# Patient Record
Sex: FEMALE | Race: WHITE
[De-identification: names, ages, dates, MRNs, and addresses within clinical notes are randomized per-mention and may not be internally consistent; named-entity substitution may affect disease eponyms.]

---

## 2020-01-05 ENCOUNTER — HOSPITAL ENCOUNTER (EMERGENCY)
Dept: HOSPITAL 59 - ER | Age: 65
Discharge: HOME | End: 2020-01-05
Payer: SELF-PAY

## 2020-01-05 DIAGNOSIS — S69.92XA: Primary | ICD-10-CM
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
## 2020-01-05 PROCEDURE — 99283 EMERGENCY DEPT VISIT LOW MDM: CPT

## 2020-01-05 NOTE — RADIOLOGY REPORT
EXAMINATION:  Left Hand, Minimum Three Views

EXAM DATE:  1/5/2020 1:46 PM



TECHNIQUE:  PA, lateral, and oblique



INDICATION:  trauma

COMPARISON:  9/22/2017



ENCOUNTER: Initial

_________________________



FINDINGS:  



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 There is lucency at both the carpal an
d metacarpal aspects consistent with loosening. No acute fracture.



Significant arthritic changes at the third and fourth PIP joints as well as all the DIP joints

_________________________



IMPRESSION:



Loosening around the prosthesis without evidence of fracture.







Dictated by: Helena Stark MD on 1/5/2020 2:22 PM.

Electronically signed by: Helena Stark MD on 1/5/2020 2:26 PM.

## 2020-01-05 NOTE — EMERGENCY DEPARTMENT RECORD
History of Present Illness





- General


Chief Complaint: Fall Injury


Stated Complaint: FALL/LT HAND PAIN


Time Seen by Provider: 20 13:29


Source: Patient


Mode of Arrival: Ambulatory


Limitations: No limitations





- History of Present Illness


Initial Comments: 


The patient is here due to L hand pain for an hour after tripping and falling 

and injuring it. She denies any L wrist pain or any other injuries. The pain is 

mainly over the proximal 1st MC bone.





MD Complaint: Fall


Onset/Timin


-: Hour(s)





- Related Data


                                    Allergies











Allergy/AdvReac Type Severity Reaction Status Date / Time


 


No Known Allergies Allergy   Unverified 17 08:51














Review of Systems


Constitutional: Denies: Chills, Fever





Past Medical History





- SOCIAL HISTORY


Smoking Status: Never smoker





- RESPIRATORY


Hx Respiratory Disorders: Yes


Hx Bronchitis: Yes (last winter)





- CARDIOVASCULAR


Hx Cardio Disorders: Yes


Hx Hypertension: Yes (meds good control)





- NEURO


Hx Neuro Disorders: Yes


Hx Seizures: Yes (grand mal, & petite-last one "years ago")


Hx Weakness: Yes (left thumb)





- GI


Hx GI Disorders: Yes


Hx Reflux: Yes





- 


Hx Genitourinary Disorders: No


Comment:: hyst





- ENDOCRINE


Hx Endocrine Disorders: No





- MUSCULOSKELETAL


Hx Musculoskeletal Disorders: Yes


Hx Arthritis: Yes (hands & all over)





- PSYCH


Hx Psych Problems: No





- HEMATOLOGY/ONCOLOGY


Hx Hematology/Oncology Disorders: No





Family Medical History


Hx Anxiety: Grandparents


Hx Cancer: Father


*Cancer Comment: dad-prostate


Hx Heart Disease: Mother, Grandparents


Hx HTN: Mother, Grandparents


Hx Seizures: Brother/Sister, Grandparents


Hx Stroke: Grandparents





Physical Exam





- General


General Appearance: Alert, Cooperative, No acute distress





- Head


Head exam: Atraumatic





- Eye


Eye exam: Normal appearance





- Extremities


Extremities exam: Normal inspection (There are no signs of trauma, swelling, or 

abrasions.), Full ROM, Normal capillary refill, Tenderness (There is tenderness 

to the proximal L thenar area. There is normal hand, wrist and finger ROM.).  

negative: Joint swelling





Course





- Reevaluation(s)


Reevaluation #1: 


I did discuss the need to wear the splint and take her home pain medicines. She 

is to see Dr. Faith for recheck in the Specialty clinic next week.


01/05/20 14:37








Medical Decision Making





- Data Complexity


MDM Data: X-Ray Ordered and/or Reviewed





- Radiology Data


Radiology results: Report reviewed (L hand: Neg for fx but possible loosening 

around the 1st MC prosthesis.)





Disposition


Disposition: Discharge


Clinical Impression: 


Hand injury


Qualifiers:


 Encounter type: initial encounter Laterality: left Qualified Code(s): S69.92XA 

- Unspecified injury of left wrist, hand and finger(s), initial encounter





Disposition: Home, Self-Care


Condition: (2) Stable


Instructions:  Hand Sprain (ED)


Additional Instructions: 


Please use Tylenol or Motrin for pain and continue to ice the hand when 

possible. Wear the splint until evaluated further by Dr. Faith in the 

Specialty clinic.


Referrals: 


[facility] [Provider Group]


Forms:  Patient Portal Access


Time of Disposition: 14:39





Quality





- Quality Measures


Quality Measures: N/A





- Blood Pressure Screening


Does Patient Have Any of the Following: No


Blood Pressure Classification: Pre-Hypertensive BP Reading


Systolic Measurement: 129


Diastolic Measurement: 79


Screening for High Blood Pressure: < Pre-Hypertensive BP, F/U Documented > 

[]


Pre-Hypertensive Follow-up Interventions: Referral to alternative/primary care 

provider.